# Patient Record
Sex: FEMALE | HISPANIC OR LATINO | URBAN - METROPOLITAN AREA
[De-identification: names, ages, dates, MRNs, and addresses within clinical notes are randomized per-mention and may not be internally consistent; named-entity substitution may affect disease eponyms.]

---

## 2019-11-13 ENCOUNTER — APPOINTMENT (RX ONLY)
Dept: URBAN - METROPOLITAN AREA CLINIC 361 | Facility: CLINIC | Age: 54
Setting detail: DERMATOLOGY
End: 2019-11-13

## 2019-11-13 DIAGNOSIS — L82.1 OTHER SEBORRHEIC KERATOSIS: ICD-10-CM

## 2019-11-13 DIAGNOSIS — D22 MELANOCYTIC NEVI: ICD-10-CM

## 2019-11-13 DIAGNOSIS — L81.4 OTHER MELANIN HYPERPIGMENTATION: ICD-10-CM

## 2019-11-13 DIAGNOSIS — D485 NEOPLASM OF UNCERTAIN BEHAVIOR OF SKIN: ICD-10-CM

## 2019-11-13 PROBLEM — D48.5 NEOPLASM OF UNCERTAIN BEHAVIOR OF SKIN: Status: ACTIVE | Noted: 2019-11-13

## 2019-11-13 PROBLEM — D22.5 MELANOCYTIC NEVI OF TRUNK: Status: ACTIVE | Noted: 2019-11-13

## 2019-11-13 PROCEDURE — ? OTHER

## 2019-11-13 PROCEDURE — ? COUNSELING

## 2019-11-13 PROCEDURE — 99203 OFFICE O/P NEW LOW 30 MIN: CPT

## 2019-11-13 ASSESSMENT — LOCATION ZONE DERM
LOCATION ZONE: TRUNK
LOCATION ZONE: FACE
LOCATION ZONE: LEG

## 2019-11-13 ASSESSMENT — LOCATION DETAILED DESCRIPTION DERM
LOCATION DETAILED: LEFT SUPERIOR UPPER BACK
LOCATION DETAILED: LEFT LATERAL MANDIBULAR CHEEK
LOCATION DETAILED: LEFT MEDIAL MALAR CHEEK
LOCATION DETAILED: RIGHT SUPERIOR MEDIAL UPPER BACK
LOCATION DETAILED: RIGHT CENTRAL MALAR CHEEK
LOCATION DETAILED: RIGHT SUPERIOR CENTRAL MALAR CHEEK
LOCATION DETAILED: LEFT ANTERIOR PROXIMAL THIGH
LOCATION DETAILED: LEFT INFERIOR MEDIAL MALAR CHEEK

## 2019-11-13 ASSESSMENT — LOCATION SIMPLE DESCRIPTION DERM
LOCATION SIMPLE: RIGHT CHEEK
LOCATION SIMPLE: RIGHT UPPER BACK
LOCATION SIMPLE: LEFT THIGH
LOCATION SIMPLE: LEFT CHEEK
LOCATION SIMPLE: LEFT UPPER BACK

## 2019-11-13 NOTE — PROCEDURE: OTHER
Note Text (......Xxx Chief Complaint.): This diagnosis correlates with the
Detail Level: Detailed
Other (Free Text): Pt states lesion has been present for years, clinical photo taken today, pt to watch for changes
Other (Free Text): Treated previously with laser at another office. Recommend seeing FE at  for consideration of further treatment.
Other (Free Text): At last office lesion was treated with laser, pt to follow up with FE for cometic removal

## 2020-01-27 ENCOUNTER — APPOINTMENT (RX ONLY)
Dept: URBAN - METROPOLITAN AREA CLINIC 361 | Facility: CLINIC | Age: 55
Setting detail: DERMATOLOGY
End: 2020-01-27

## 2020-01-27 DIAGNOSIS — L82.1 OTHER SEBORRHEIC KERATOSIS: ICD-10-CM

## 2020-01-27 DIAGNOSIS — Z41.9 ENCOUNTER FOR PROCEDURE FOR PURPOSES OTHER THAN REMEDYING HEALTH STATE, UNSPECIFIED: ICD-10-CM

## 2020-01-27 PROCEDURE — 99212 OFFICE O/P EST SF 10 MIN: CPT

## 2020-01-27 PROCEDURE — ? LIQUID NITROGEN (COSMETIC)

## 2020-01-27 PROCEDURE — ? COUNSELING

## 2020-01-27 PROCEDURE — ? TREATMENT REGIMEN

## 2020-01-27 PROCEDURE — ? OTHER

## 2020-01-27 ASSESSMENT — LOCATION ZONE DERM: LOCATION ZONE: FACE

## 2020-01-27 ASSESSMENT — LOCATION SIMPLE DESCRIPTION DERM
LOCATION SIMPLE: RIGHT CHEEK
LOCATION SIMPLE: LEFT CHEEK

## 2020-01-27 ASSESSMENT — LOCATION DETAILED DESCRIPTION DERM
LOCATION DETAILED: LEFT CENTRAL MALAR CHEEK
LOCATION DETAILED: RIGHT CENTRAL MALAR CHEEK
LOCATION DETAILED: LEFT LATERAL BUCCAL CHEEK

## 2020-01-27 NOTE — HPI: SKIN LESION
How Severe Is Your Skin Lesion?: moderate
Has Your Skin Lesion Been Treated?: been treated
Is This A New Presentation, Or A Follow-Up?: Skin Lesions
Additional History: Patient most bothered by lesion under right eye.  She states laser done years ago with resolution.  However, recently recurred and enlarged.  Was seen and treated elsewhere with 2 IPL treatments without benefit.  Patient interested in treatment today to lighten pigmentation.

## 2020-01-27 NOTE — HPI: COSMETIC (IPL)
Eye Color: brown
Have You Had Laser Removal Of Brown Spots Before?: has had previous treatment
When Outside In The Sun, Do You...: mildly burns, tans slowly
When Was Your Last Ipl Treatment?: Feb 2019
Additional History: Had 2 IPL tx’s in the past elsewhere.

## 2020-01-27 NOTE — PROCEDURE: OTHER
Detail Level: Detailed
Other (Free Text): Patient has used refissa in past but is having a difficult time finding this at this point.  Therefore, options offered today
Note Text (......Xxx Chief Complaint.): This diagnosis correlates with the
Other (Free Text): Pt stated that she had laser treatment 10 years spot went away then recurred and had laser treatment in January and February of last year. Recommended LN2 treatment. Will treat only bottom half to see response.  If good response will treat rest of lesion.  IPL reviewed to lentiginies and telangiectasias scattered on the face

## 2020-01-27 NOTE — PROCEDURE: TREATMENT REGIMEN
Plan: Discussed proper application of Vit C Serum morning, Retinol Forte 10X evening. Patient understands all instructions given.\\nPurchased Vit C 20% Serum and Retinol Forte 10X
Detail Level: Zone